# Patient Record
Sex: FEMALE | Race: WHITE
[De-identification: names, ages, dates, MRNs, and addresses within clinical notes are randomized per-mention and may not be internally consistent; named-entity substitution may affect disease eponyms.]

---

## 2018-06-26 ENCOUNTER — HOSPITAL ENCOUNTER (OUTPATIENT)
Dept: HOSPITAL 68 - STS | Age: 58
End: 2018-06-26
Payer: COMMERCIAL

## 2018-06-26 VITALS — HEIGHT: 63 IN | BODY MASS INDEX: 20.91 KG/M2 | WEIGHT: 118 LBS

## 2018-06-26 DIAGNOSIS — R56.9: ICD-10-CM

## 2018-06-26 DIAGNOSIS — H25.9: Primary | ICD-10-CM

## 2018-06-26 PROCEDURE — V2632 POST CHMBR INTRAOCULAR LENS: HCPCS

## 2018-06-26 NOTE — OPERATIVE REPORT
Operative/Inv Procedure Report
Surgery Date: 06/26/18
Name of Procedure:
Cataract extraction lens implantation right eye
Pre-Operative Diagnosis:
Age-related cataract right eye 20/30 vision 20/100 glare vision
Post-Operative Diagnosis:
Same
Estimated Blood Loss: none
Surgeon/Assistant:
Gene CHEN,Holden RICHARDS
 
Anesthesia: local monitored anesthesi
Complications:
None
 
Operative/Procedure Note
Note:
The patient was brought to the operating room standard monitoring equipment was 
attached the patient was prepped and draped in the usual fashion for intraocular
surgery.  A lid speculum was placed to retract the lids.  The case was begun by 
making a temporal incision with a 2.4 mm keratome.  The eye was stabilized with 
a Goldberg ring during this incision.  1 mL of non-preserved lidocaine was 
introduced into the anterior chamber to provide anesthesia.  The anterior 
chamber was then filled and deepened with viscoelastic.  A curvilinear 
capsulorrhexis was achieved using a 30-gauge needle and is a cystotome and 
capsulorrhexis was finished using a Utrata forceps.  A second or paracentesis 
incision was made temporally with a 1 mm MVR blade.  The lens was then 
hydrodissected with balanced salt solution and found to be rotatable.  The lens 
was emulsified using phacoemulsification and a modified four-quadrant cracking 
technique.  The residual cortical material was removed using automated 
irrigation and aspiration and as much of the anterior capsular rim was cleaned 
as well as possible.  The posterior capsule was cleaned first with the automated
machine on a low setting and then manually with a Elbert squeegee.  The capsular 
bag was deepened with viscoelastic.  The lens a Technis 1 23.0  Diopter placed 
into the bag under direct visualization and rotated so that the haptics were at 
12 and 6:00.  Viscoelastic was then removed from the eye by flushing it out and 
then by automated irrigation and aspiration.  The eye was pressurized to a 
normal tone.  1/10 of a cc of cefuroxime  solution was introduced into the 
anterior chamber to provide antibiotic prophylaxis.  The wounds were sealed by 
hydrating the stroma adjacent to them and the eye was left at a proper tone 
after the wounds were checked and found not to be leaking.  The lid speculum was
removed from the orbit.  Antibiotic and steroid drops were placed on the eye and
then the eye was shielded.  Monitoring equipment was removed from the patient 
and the patient was removed from the operative suite to the holding area.  The 
patient tolerated the procedure well and will be seen in the office tomorrow.